# Patient Record
Sex: MALE | Race: WHITE | NOT HISPANIC OR LATINO | Employment: OTHER | URBAN - METROPOLITAN AREA
[De-identification: names, ages, dates, MRNs, and addresses within clinical notes are randomized per-mention and may not be internally consistent; named-entity substitution may affect disease eponyms.]

---

## 2023-03-07 ENCOUNTER — OFFICE VISIT (OUTPATIENT)
Dept: DERMATOLOGY | Facility: CLINIC | Age: 88
End: 2023-03-07

## 2023-03-07 VITALS — HEIGHT: 68 IN | WEIGHT: 176.5 LBS | BODY MASS INDEX: 26.75 KG/M2

## 2023-03-07 DIAGNOSIS — L82.1 SEBORRHEIC KERATOSIS: ICD-10-CM

## 2023-03-07 DIAGNOSIS — R21 RASH: Primary | ICD-10-CM

## 2023-03-07 RX ORDER — TORSEMIDE 5 MG/1
TABLET ORAL
COMMUNITY
Start: 2023-02-14

## 2023-03-07 RX ORDER — HYDRALAZINE HYDROCHLORIDE 25 MG/1
25 TABLET, FILM COATED ORAL 3 TIMES DAILY
COMMUNITY
Start: 2023-02-22 | End: 2024-02-22

## 2023-03-07 NOTE — PROGRESS NOTES
[facility] Note     Patient Name: Audrey Wu  Encounter Date: 3/07/2023     Have you been cared for by a [place] [address] Dermatologist in the last 3 years and, if so, which description applies to you? NO  I am considered a "new" patient and must complete all patient intake questions  I am MALE/not capable of bearing children  REVIEW OF SYSTEMS:  Have you recently had or currently have any of the following? · Recent fever or chills? No  · Any non-healing wound? No   PAST MEDICAL HISTORY:  Have you personally ever had or currently have any of the following? If "YES," then please provide more detail  · Skin cancer (such as Melanoma, Basal Cell Carcinoma, Squamous Cell Carcinoma? No  · Tuberculosis, HIV/AIDS, Hepatitis B or C: No  · Systemic Immunosuppression such as Diabetes, Biologic or Immunotherapy, Chemotherapy, Organ Transplantation, Bone Marrow Transplantation No  · Radiation Treatment No   FAMILY HISTORY:  Any "first degree relatives" (parent, brother, sister, or child) with the following? • Skin Cancer, Pancreatic or Other Cancer? No   PATIENT EXPERIENCE:    • Do you want the Dermatologist to perform a COMPLETE skin exam today including a clinical examination under the "bra and underwear" areas? Yes  • If necessary, do we have your permission to call and leave a detailed message on your Preferred Phone number that includes your specific medical information?   Yes      No Known Allergies   Current Outpatient Medications:   •  apixaban (ELIQUIS) 2 5 mg, Take 2 5 mg by mouth 2 (two) times a day, Disp: , Rfl:   •  hydrALAZINE (APRESOLINE) 25 mg tablet, Take 25 mg by mouth Three times a day, Disp: , Rfl:   •  apixaban (Eliquis) 5 mg, Take by mouth, Disp: , Rfl:   •  torsemide (DEMADEX) 5 MG tablet, TAKE ONE CAPSULE BY MOUTH DAILY EVERY MORNING, Disp: , Rfl:           • Whom besides the patient is providing clinical information about today's encounter?   o NO ADDITIONAL HISTORIAN (patient alone provided history)    Physical Exam and Assessment/Plan by Diagnosis:        Scalp and waist up - rash present for a year, noticed since he had a pacemaker placed  1  SEBORRHEIC KERATOSIS; NON-INFLAMED    Physical Exam:  • Anatomic Location Affected:  Trunk and extremities   • Morphological Description:  Flat and raised, waxy, smooth to warty textured, yellow to brownish-grey to dark brown to blackish, discrete, "stuck-on" appearing papules  • Pertinent Positives:  • Pertinent Negatives: Additional History of Present Condition:  Patient reports new bumps on the skin  Denies itch, burn, pain, bleeding or ulceration  Present constantly; nothing seems to make it worse or better  No prior treatment  Assessment and Plan:  Based on a thorough discussion of this condition and the management approach to it (including a comprehensive discussion of the known risks, side effects and potential benefits of treatment), the patient (family) agrees to implement the following specific plan:  • Reassure benign  • No treatment required  2  RASH - drug induced hypersensitivity reaction vs drug induced SCLE vs eczema      Physical Exam:  • (Anatomic Location); (Size and Morphological Description); (Differential Diagnosis):  • Located on Upper body , face and neck area;   Erythematous plaques and papules broadly with excoriation  Differential diagnosis: hypersensitivity versus eczema versus drug induced reaction  • Pertinent Positives:  • Pertinent Negatives: Additional History of Present Condition:  Patient notes rash likely started before his pacemaker was implanted  He is noted to have been on HCTZ since at least 2021  Rash worsened after pacer implantation  In fall of 2022 HCTZ was switched to torsemide due to complaints of a rash  Did not improve  Was subsequently switched from amlodipine to hydralazine  Patient states that hydralazine still makes his rash worse when taken   He does not note any difference in the rash when taking his torsemide  Assessment and Plan:  Based on a thorough discussion of this condition and the management approach to it (including a comprehensive discussion of the known risks, side effects and potential benefits of treatment), the patient (family) agrees to implement the following specific plan:  · Discussed and recommend patient to follow up in 1-2 months with cardiologist to see if hydralazine medication needs to be changed due to possible reaction  · Will perform a punch biopsy today  · Start using prescribed Triamcinolone 0 1% ointment  Apply topically to arms and body area twice a day for 2 weeks straight  Avoid using on face area  · Use moisturizer like Eucerin,Cerave, Vanicream or Aveeno Cream 3 times a day for the dry skin  · Will call patient with biopsy results in 2 weeks and follow up with [facility] as well  •          PROCEDURE NOTE:  PUNCH BIOPSY      Performing Physician: Higinio Argueta    Anatomic Location; Clinical Description with size (cm); Pre-Op Diagnosis:   • A; Left upper arm; Skin punch biopsy; 80year old male with erythematous plaques and papules broadly on upper body , face and neck  With excoriation  Differential diagnosis: hypersensitivity versus eczema versus drug induced reaction  Anesthesia: 1% Lidocaine HCL      Topical anesthesia: None       Indications: To indicate diagnosis and management plan  Procedure Details     Patient informed of the risks (including bleeding,scaring and infection) and benefits of the procedure explained  Verbal and written informed consent obtained  The area was prepped and draped in the usual fashion  Anesthesia was obtained with 1% lidocaine with epinephrine  The skin was then stretched perpendicular to the skin tension lines and a punch biopsy to an appropriate sampling depth was obtained with a 4 mm punch with a forceps and iris scissors       Hemostasis was obtained with 3-0 vicryl  x 2 Dissolvable sutures  Complications:  None      Specimen has been sent for review by Dermatopathology  Plan:  1  Instructed to keep the wound dry and covered for 24-48h and clean thereafter  2  Warning signs of infection were reviewed  3  Recommended that the patient use acetaminophen as needed for pain        Standard post-procedure care has been explained and has been included in written form within the patient's copy of Informed Consent      Scribe Attestation    I,:  Kelsie Cee MA am acting as a scribe while in the presence of the attending physician :       I,:  Jaciel Sanz MD personally performed the services described in this documentation    as scribed in my presence :         Dakota Garcia MD  Dermatology, PGY-2

## 2023-03-16 DIAGNOSIS — R21 RASH: Primary | ICD-10-CM

## 2023-03-16 NOTE — RESULT ENCOUNTER NOTE
DERMATOPATHOLOGY RESULT NOTE    Results reviewed by ordering physician  Called patient to personally discuss results  Discussed results with patient  Instructions for Clinical Derm Team:   (remember to route Result Note to appropriate staff):    None    Result & Plan by Specimen:    Specimen A: benign  Plan: Patient will obtain labs for  and   Tissue Exam: [id number]  Order: [id number]  Status: Final result     Visible to patient: No (inaccessible in [address])     Dx: Rash     0 Result Notes  Component   Case Report  Surgical Pathology Report                         Case: [id number]                                   Authorizing Provider: Brian Lyn MD           Collected:           03/07/2023 1026              Ordering Location:     [facility]      Received:            03/07/2023 [facility]                                     [place]                                                                       Pathologist:           Jenniffer Pantoja MD                                                             Specimen:    Skin, Other, A; Left upper arm                                                           Final Diagnosis  A  Skin, left upper arm, punch biopsy:  Spongiotic dermatitis with eosinophils  See note         Note:  The histopathologic differential diagnosis includes eczematous dermatitis (i e  allergic contact dermatitis, atopic dermatitis), and urticarial phase of immunobullous dermatosis  Foci of predominantly subepidermal split are noted; while these can be artifactual, correlation with clinical findings and immunofluorescence studies is advised to rule out immunobullous diseases (i e  bullous pemphigoid)        Electronically signed by Jenniffer Pantoja MD on 3/13/2023 at 12:58 PM  Microscopic Description   The epidermis shows acanthosis with prominent intercellular edema and eosinophil exocytosis  Foci of predominantly subepidermal separation is noted   There is a superficial to mid dermal perivascular and interstitial infiltrate of lymphocytes and notable eosinophils  PAS stain does not reveal any fungal organisms  Deeper levels were examined  Additional Information   All reported additional testing was performed with appropriately reactive controls   These tests were developed and their performance characteristics determined by [facility] or appropriate performing facility, though some tests may be performed on tissues which have not been validated for performance characteristics (such as staining performed on alcohol exposed cell blocks and decalcified tissues)   Results should be interpreted with caution and in the context of the patients' clinical condition  These tests may not be cleared or approved by the [place]  Food and Drug Administration, though the FDA has determined that such clearance or approval is not necessary  These tests are used for clinical purposes and they should not be regarded as investigational or for research  This laboratory has been approved by [facility], designated as a high-complexity laboratory and is qualified to perform these tests  Mason Teddy Description   A  The specimen is received in formalin, labeled with the patient's name and hospital number, and is designated " skin left upper arm"  It consists of a 0 4 x 0 4 cm disrupted skin punch biopsy excised to a depth of 0 2 cm  No lesion is grossly identified on the tan wrinkled epidermis  The epidermis is inked red and the resection margin is inked green  The specimen is bisected  Entirely submitted between sponges in cassette A1      Note: The estimated total formalin fixation time based upon information provided by the submitting clinician and the standard processing schedule is under 72 hours  [place]  Clinical Information   A; Left upper arm; Skin punch biopsy; 80year old male with erythematous plaques and papules broadly on upper body , face and neck  With excoriation  Differential diagnosis: hypersensitivity versus eczema versus drug induced reaction       Attention dermatopathologist   Resulting Agency [facility]    Specimen Collected: 03/07/23 10:26 AM Last Resulted: 03/13/23 12:58 PM

## 2023-03-17 ENCOUNTER — APPOINTMENT (OUTPATIENT)
Dept: LAB | Facility: MEDICAL CENTER | Age: 88
End: 2023-03-17

## 2023-03-17 DIAGNOSIS — R21 RASH: ICD-10-CM

## 2023-03-24 LAB — MISCELLANEOUS LAB TEST RESULT: NORMAL

## 2023-03-26 DIAGNOSIS — R21 RASH: ICD-10-CM

## 2023-03-28 DIAGNOSIS — R21 RASH: Primary | ICD-10-CM

## 2023-03-28 RX ORDER — PREDNISONE 50 MG/1
50 TABLET ORAL DAILY
Qty: 10 TABLET | Refills: 0 | Status: SHIPPED | OUTPATIENT
Start: 2023-03-28 | End: 2023-04-04

## 2023-04-03 DIAGNOSIS — R21 RASH: ICD-10-CM

## 2023-04-04 RX ORDER — PREDNISONE 50 MG/1
TABLET ORAL
Qty: 10 TABLET | Refills: 0 | Status: SHIPPED | OUTPATIENT
Start: 2023-04-04

## 2023-04-06 ENCOUNTER — OFFICE VISIT (OUTPATIENT)
Dept: DERMATOLOGY | Facility: CLINIC | Age: 88
End: 2023-04-06

## 2023-04-06 VITALS — WEIGHT: 175 LBS | HEIGHT: 69 IN | BODY MASS INDEX: 25.92 KG/M2 | TEMPERATURE: 96.8 F

## 2023-04-06 DIAGNOSIS — L30.9 ECZEMA, UNSPECIFIED TYPE: ICD-10-CM

## 2023-04-06 DIAGNOSIS — R21 RASH: Primary | ICD-10-CM

## 2023-04-06 DIAGNOSIS — R21 RASH: ICD-10-CM

## 2023-04-06 RX ORDER — DOXYCYCLINE 100 MG/1
CAPSULE ORAL
Qty: 60 CAPSULE | Refills: 2 | Status: SHIPPED | OUTPATIENT
Start: 2023-04-06 | End: 2023-07-06

## 2023-04-06 NOTE — PROGRESS NOTES
"[facility]     Patient Name: Geremias Rodriguez  Encounter Date:4/6/23     Have you been cared for by a [place] [address] Dermatologist in the last 3 years and, if so, which description applies to you? Yes  I have been here within the last 3 years, and my medical history has NOT changed since that time  I am MALE/not capable of bearing children  REVIEW OF SYSTEMS:  Have you recently had or currently have any of the following? · No changes in my recent health  PAST MEDICAL HISTORY:  Have you personally ever had or currently have any of the following? If \"YES,\" then please provide more detail  · No changes in my medical history  FAMILY HISTORY:  Any \"first degree relatives\" (parent, brother, sister, or child) with the following? • No changes in my family's known health  PATIENT EXPERIENCE:    • Do you want the Dermatologist to perform a COMPLETE skin exam today including a clinical examination under the \"bra and underwear\" areas? NO  • If necessary, do we have your permission to call and leave a detailed message on your Preferred Phone number that includes your specific medical information? Yes      Allergies   Allergen Reactions   • Sesame Oil - Food Allergy Rash      Current Outpatient Medications:   •  apixaban (ELIQUIS) 2 5 mg, Take 2 5 mg by mouth 2 (two) times a day, Disp: , Rfl:   •  apixaban (ELIQUIS) 5 mg, Take by mouth, Disp: , Rfl:   •  torsemide (DEMADEX) 5 MG tablet, TAKE ONE CAPSULE BY MOUTH DAILY EVERY MORNING, Disp: , Rfl:   •  hydrALAZINE (APRESOLINE) 25 mg tablet, Take 25 mg by mouth Three times a day (Patient not taking: Reported on 4/6/2023), Disp: , Rfl:   •  predniSONE 50 mg tablet, TAKE ONE TABLET BY MOUTH DAILY, Disp: 10 tablet, Rfl: 0  •  triamcinolone (KENALOG) 0 1 % ointment, Apply topically twice a day to arms and body area for 14 days straight  Avoid using on face area   (Patient not taking: Reported on 4/6/2023), Disp: 453 6 g, Rfl: 0          • Whom " besides the patient is providing clinical information about today's encounter?   o NO ADDITIONAL HISTORIAN (patient alone provided history)    Physical Exam and Assessment/Plan by Diagnosis:      ECZEMATOUS DERMATITIS     Physical Exam:  • Anatomic Location Affected:  Trunk and extremities   • Morphological Description:  No rash present on exam today    Additional History of Present Condition:  Patient presents in office with resolved rash  Patient has been treated with oral Prednisone 50 mg tablet x 10 days and Triamcinolone 0 1% ointment   antigen was slightly positive at cut off being 9 and it was 9 3  However, given that the rash completely resolved after a short course of prednisone, will treat this as primarily an eczematous dermatitis at this time with non-immunosuppressive treatments such as doxycycline and OTC nicotinamide  Specimens  A Skin, Other, A; Left upper arm    Maryanne Kate Final Diagnosis  A  Skin, left upper arm, punch biopsy:  Spongiotic dermatitis with eosinophils  See note  Note:  The histopathologic differential diagnosis includes eczematous dermatitis (i e  allergic contact dermatitis, atopic dermatitis), and  urticarial phase of immunobullous dermatosis  Foci of predominantly subepidermal split are noted; while these can be artifactual,  correlation with clinical findings and immunofluorescence studies is advised to rule out immunobullous diseases (i e  bullous  pemphigoid)  Assessment and Plan:  Based on a thorough discussion of this condition and the management approach to it (including a comprehensive discussion of the known risks, side effects and potential benefits of treatment), the patient (family) agrees to implement the following specific plan:  • Start Doxycycline 100 mg twice daily for 3 months  Take with a full meal and a full glass of water  Do not lie down for at least 30 minutes after taking medication   Medication will make you more sensitive to the sun, wear sunscreen and monitor for sun sensitivity  • Continue using Triamcinolone 0 1% as needed for flare ups  Use twice daily for 2 weeks, take 1 week break between uses  Do NOT use on face, armpits, or groin  • Start Nicotinamide (over the counter supplement)- take 500mg 3 times daily for 3 months (okay to take 1-2 times a day)  • Get lab work  AND  Antibody before next visit to recheck  • Discussed Dupixent as alternate form of treatment if above mentioned is not successful   • Instructed patient to let us know if rash comes back significantly or is changing in appearance in the meantime  • Follow up in 2 months        Scribe Attestation    I,:  Keri Hernandez am acting as a scribe while in the presence of the attending physician :       I,:  Rafaela Wang MD personally performed the services described in this documentation    as scribed in my presence :         The patient was seen and discussed with Dr Godfrey Tim       RTC: 2 months for eczematous dermatitis follow-up    Meg Costa  Dermatology PGY-4 Resident Physician

## 2023-04-06 NOTE — PATIENT INSTRUCTIONS
ECZEMATOUS DERMATITIS     Assessment and Plan:  Based on a thorough discussion of this condition and the management approach to it (including a comprehensive discussion of the known risks, side effects and potential benefits of treatment), the patient (family) agrees to implement the following specific plan:  Start Doxycycline 100 mg twice daily for 3 months  Take with a full meal and a full glass of water  Do not lie down for at least 30 minutes after taking medication  Medication will make you more sensitive to the sun, wear sunscreen and monitor for sun sensitivity  Continue using Triamcinolone 0 1% as needed for flare ups  Use twice daily for 2 weeks, take 1 week break between uses  Do NOT use on face, armpits, or groin      Start Nicotinamide (over the counter supplement)- take 500mg 3 times daily for 3 months (okay to take 1-2 times a day)  Get lab work  AND  Antibody before next visit to recheck  Discussed Dupixent as alternate form of treatment if above mentioned is not successful   Instructed patient to let us know if rash comes back significantly or is changing in appearance in the meantime  Follow up in 2 months
